# Patient Record
Sex: FEMALE | URBAN - METROPOLITAN AREA
[De-identification: names, ages, dates, MRNs, and addresses within clinical notes are randomized per-mention and may not be internally consistent; named-entity substitution may affect disease eponyms.]

---

## 2021-08-16 ENCOUNTER — APPOINTMENT (RX ONLY)
Dept: URBAN - METROPOLITAN AREA CLINIC 170 | Facility: CLINIC | Age: 81
Setting detail: DERMATOLOGY
End: 2021-08-16

## 2021-08-16 DIAGNOSIS — L259 CONTACT DERMATITIS AND OTHER ECZEMA, UNSPECIFIED CAUSE: ICD-10-CM

## 2021-08-16 PROBLEM — L30.9 DERMATITIS, UNSPECIFIED: Status: ACTIVE | Noted: 2021-08-16

## 2021-08-16 PROCEDURE — ? COUNSELING

## 2021-08-16 PROCEDURE — ? BIOPSY BY SHAVE METHOD

## 2021-08-16 PROCEDURE — 11102 TANGNTL BX SKIN SINGLE LES: CPT

## 2021-08-16 ASSESSMENT — LOCATION SIMPLE DESCRIPTION DERM: LOCATION SIMPLE: LEFT PRETIBIAL REGION

## 2021-08-16 ASSESSMENT — LOCATION ZONE DERM: LOCATION ZONE: LEG

## 2021-08-16 ASSESSMENT — LOCATION DETAILED DESCRIPTION DERM: LOCATION DETAILED: LEFT PROXIMAL PRETIBIAL REGION

## 2021-08-23 ENCOUNTER — RX ONLY (OUTPATIENT)
Age: 81
Setting detail: RX ONLY
End: 2021-08-23

## 2021-08-23 RX ORDER — TRIAMCINOLONE ACETONIDE 1 MG/G
CREAM TOPICAL
Qty: 1 | Refills: 0 | Status: ERX | COMMUNITY
Start: 2021-08-23

## 2021-09-13 ENCOUNTER — APPOINTMENT (RX ONLY)
Dept: URBAN - METROPOLITAN AREA CLINIC 170 | Facility: CLINIC | Age: 81
Setting detail: DERMATOLOGY
End: 2021-09-13

## 2021-09-13 DIAGNOSIS — L259 CONTACT DERMATITIS AND OTHER ECZEMA, UNSPECIFIED CAUSE: ICD-10-CM

## 2021-09-13 PROBLEM — L30.8 OTHER SPECIFIED DERMATITIS: Status: ACTIVE | Noted: 2021-09-13

## 2021-09-13 PROCEDURE — ? TREATMENT REGIMEN

## 2021-09-13 PROCEDURE — 99213 OFFICE O/P EST LOW 20 MIN: CPT

## 2021-09-13 PROCEDURE — ? COUNSELING

## 2021-09-13 ASSESSMENT — LOCATION SIMPLE DESCRIPTION DERM: LOCATION SIMPLE: LEFT PRETIBIAL REGION

## 2021-09-13 ASSESSMENT — LOCATION ZONE DERM: LOCATION ZONE: LEG

## 2021-09-13 ASSESSMENT — LOCATION DETAILED DESCRIPTION DERM: LOCATION DETAILED: LEFT PROXIMAL PRETIBIAL REGION

## 2021-09-13 NOTE — PROCEDURE: TREATMENT REGIMEN
Continue Regimen: Triamcinolone Cream- Apply to affected area twice daily x6 weeks in 2 week intervals
Plan: Will follow up in 6 weeks
Detail Level: Zone

## 2021-10-26 ENCOUNTER — APPOINTMENT (RX ONLY)
Dept: URBAN - METROPOLITAN AREA CLINIC 170 | Facility: CLINIC | Age: 81
Setting detail: DERMATOLOGY
End: 2021-10-26

## 2021-10-26 DIAGNOSIS — L259 CONTACT DERMATITIS AND OTHER ECZEMA, UNSPECIFIED CAUSE: ICD-10-CM

## 2021-10-26 PROBLEM — L30.8 OTHER SPECIFIED DERMATITIS: Status: ACTIVE | Noted: 2021-10-26

## 2021-10-26 PROCEDURE — ? COUNSELING

## 2021-10-26 PROCEDURE — ? TREATMENT REGIMEN

## 2021-10-26 PROCEDURE — 99214 OFFICE O/P EST MOD 30 MIN: CPT

## 2021-10-26 PROCEDURE — ? PRESCRIPTION

## 2021-10-26 RX ORDER — TRIAMCINOLONE ACETONIDE 1 MG/G
CREAM TOPICAL BID
Qty: 80 | Refills: 3 | Status: ERX | COMMUNITY
Start: 2021-10-26

## 2021-10-26 RX ADMIN — TRIAMCINOLONE ACETONIDE: 1 CREAM TOPICAL at 00:00

## 2021-10-26 ASSESSMENT — LOCATION SIMPLE DESCRIPTION DERM: LOCATION SIMPLE: LEFT PRETIBIAL REGION

## 2021-10-26 ASSESSMENT — LOCATION DETAILED DESCRIPTION DERM: LOCATION DETAILED: LEFT PROXIMAL PRETIBIAL REGION

## 2021-10-26 ASSESSMENT — LOCATION ZONE DERM: LOCATION ZONE: LEG

## 2021-10-26 NOTE — PROCEDURE: TREATMENT REGIMEN
Continue Regimen: Triamcinolone Cream- Apply to affected area twice daily x6 weeks in 2 week intervals until healed, then PRN flares.
Detail Level: Zone